# Patient Record
Sex: FEMALE
[De-identification: names, ages, dates, MRNs, and addresses within clinical notes are randomized per-mention and may not be internally consistent; named-entity substitution may affect disease eponyms.]

---

## 2019-11-18 LAB
ALBUMIN SERPL-MCNC: 3.7 G/DL (ref 3.5–5)
ALBUMIN/GLOB SERPL: 1.2 {RATIO} (ref 0.8–2)
ALP SERPL-CCNC: 68 IU/L (ref 40–150)
ALT SERPL-CCNC: 14 IU/L (ref 0–55)
ANION GAP SERPL CALC-SCNC: 11.8 MMOL/L (ref 8–16)
BASOPHILS # BLD AUTO: 0 10*3/UL (ref 0–0.1)
BASOPHILS NFR BLD AUTO: 0.7 % (ref 0–1)
BUN SERPL-MCNC: 17 MG/DL (ref 7–26)
BUN/CREAT SERPL: 23 (ref 6–25)
CALCIUM SERPL-MCNC: 9.3 MG/DL (ref 8.4–10.2)
CHLORIDE SERPL-SCNC: 103 MMOL/L (ref 98–107)
CO2 SERPL-SCNC: 26 MMOL/L (ref 22–29)
DEPRECATED NEUTROPHILS # BLD AUTO: 3.3 10*3/UL (ref 2.1–6.9)
EGFRCR SERPLBLD CKD-EPI 2021: > 60 ML/MIN (ref 60–?)
EOSINOPHIL # BLD AUTO: 0.2 10*3/UL (ref 0–0.4)
EOSINOPHIL NFR BLD AUTO: 3.4 % (ref 0–6)
ERYTHROCYTE [DISTWIDTH] IN CORD BLOOD: 13.2 % (ref 11.7–14.4)
GLOBULIN PLAS-MCNC: 3.1 G/DL (ref 2.3–3.5)
GLUCOSE SERPLBLD-MCNC: 86 MG/DL (ref 74–118)
HCT VFR BLD AUTO: 36.1 % (ref 34.2–44.1)
HGB BLD-MCNC: 12.2 G/DL (ref 12–16)
LYMPHOCYTES # BLD: 2.1 10*3/UL (ref 1–3.2)
LYMPHOCYTES NFR BLD AUTO: 34.2 % (ref 18–39.1)
MCH RBC QN AUTO: 31.6 PG (ref 28–32)
MCHC RBC AUTO-ENTMCNC: 33.8 G/DL (ref 31–35)
MCV RBC AUTO: 93.5 FL (ref 81–99)
MONOCYTES # BLD AUTO: 0.5 10*3/UL (ref 0.2–0.8)
MONOCYTES NFR BLD AUTO: 7.9 % (ref 4.4–11.3)
NEUTS SEG NFR BLD AUTO: 53.5 % (ref 38.7–80)
PLATELET # BLD AUTO: 222 X10E3/UL (ref 140–360)
POTASSIUM SERPL-SCNC: 3.8 MMOL/L (ref 3.5–5.1)
RBC # BLD AUTO: 3.86 X10E6/UL (ref 3.6–5.1)
SODIUM SERPL-SCNC: 137 MMOL/L (ref 136–145)

## 2019-11-18 NOTE — DIAGNOSTIC IMAGING REPORT
EXAMINATION:  CHEST 2 VIEWS    



INDICATION: Pre-operative



COMPARISON: None

     

FINDINGS:



LINES/TUBES:None



LUNGS:The lungs are well-inflated. No focal consolidation or pulmonary edema.



PLEURA:No pleural effusion or pneumothorax.



MEDIASTINUM:The cardiomediastinal silhouette appears normal in size and shape.

Atherosclerotic calcifications of the thoracic aorta.



BONES/SOFT TISSUES:No acute osseous injury.



ABDOMEN:No free air under the diaphragm. Status post cholecystectomy.





IMPRESSION: 

No focal pneumonia or pulmonary edema.



Signed by: Betty Le MD on 11/18/2019 1:19 PM

## 2019-11-19 ENCOUNTER — HOSPITAL ENCOUNTER (OUTPATIENT)
Dept: HOSPITAL 88 - OR | Age: 69
Setting detail: OBSERVATION
LOS: 1 days | Discharge: HOME | End: 2019-11-20
Attending: OBSTETRICS & GYNECOLOGY | Admitting: OBSTETRICS & GYNECOLOGY
Payer: MEDICARE

## 2019-11-19 VITALS — DIASTOLIC BLOOD PRESSURE: 76 MMHG | SYSTOLIC BLOOD PRESSURE: 126 MMHG

## 2019-11-19 VITALS — DIASTOLIC BLOOD PRESSURE: 75 MMHG | SYSTOLIC BLOOD PRESSURE: 123 MMHG

## 2019-11-19 VITALS — SYSTOLIC BLOOD PRESSURE: 123 MMHG | DIASTOLIC BLOOD PRESSURE: 75 MMHG

## 2019-11-19 VITALS — SYSTOLIC BLOOD PRESSURE: 142 MMHG | DIASTOLIC BLOOD PRESSURE: 81 MMHG

## 2019-11-19 VITALS — BODY MASS INDEX: 27.58 KG/M2 | WEIGHT: 161.56 LBS | HEIGHT: 64 IN

## 2019-11-19 VITALS — DIASTOLIC BLOOD PRESSURE: 81 MMHG | SYSTOLIC BLOOD PRESSURE: 142 MMHG

## 2019-11-19 DIAGNOSIS — Z01.812: ICD-10-CM

## 2019-11-19 DIAGNOSIS — E11.9: ICD-10-CM

## 2019-11-19 DIAGNOSIS — Z01.810: ICD-10-CM

## 2019-11-19 DIAGNOSIS — N81.2: ICD-10-CM

## 2019-11-19 DIAGNOSIS — Z01.811: ICD-10-CM

## 2019-11-19 DIAGNOSIS — Z82.49: ICD-10-CM

## 2019-11-19 DIAGNOSIS — N81.11: Primary | ICD-10-CM

## 2019-11-19 DIAGNOSIS — I10: ICD-10-CM

## 2019-11-19 DIAGNOSIS — Z83.3: ICD-10-CM

## 2019-11-19 PROCEDURE — 36415 COLL VENOUS BLD VENIPUNCTURE: CPT

## 2019-11-19 PROCEDURE — 85025 COMPLETE CBC W/AUTO DIFF WBC: CPT

## 2019-11-19 PROCEDURE — 82948 REAGENT STRIP/BLOOD GLUCOSE: CPT

## 2019-11-19 PROCEDURE — 80053 COMPREHEN METABOLIC PANEL: CPT

## 2019-11-19 PROCEDURE — 71046 X-RAY EXAM CHEST 2 VIEWS: CPT

## 2019-11-19 PROCEDURE — 86850 RBC ANTIBODY SCREEN: CPT

## 2019-11-19 PROCEDURE — 57282 COLPOPEXY EXTRAPERITONEAL: CPT

## 2019-11-19 PROCEDURE — 93005 ELECTROCARDIOGRAM TRACING: CPT

## 2019-11-19 PROCEDURE — 88307 TISSUE EXAM BY PATHOLOGIST: CPT

## 2019-11-19 PROCEDURE — 58260 VAGINAL HYSTERECTOMY: CPT

## 2019-11-19 PROCEDURE — 86900 BLOOD TYPING SEROLOGIC ABO: CPT

## 2019-11-19 PROCEDURE — 88305 TISSUE EXAM BY PATHOLOGIST: CPT

## 2019-11-19 RX ADMIN — HYDROCODONE BITARTRATE AND ACETAMINOPHEN PRN EA: 10; 325 TABLET ORAL at 20:33

## 2019-11-19 RX ADMIN — SODIUM CHLORIDE, POTASSIUM CHLORIDE, SODIUM LACTATE AND CALCIUM CHLORIDE SCH MLS/HR: 600; 310; 30; 20 INJECTION, SOLUTION INTRAVENOUS at 23:59

## 2019-11-19 RX ADMIN — SODIUM CHLORIDE, POTASSIUM CHLORIDE, SODIUM LACTATE AND CALCIUM CHLORIDE SCH MLS/HR: 600; 310; 30; 20 INJECTION, SOLUTION INTRAVENOUS at 15:37

## 2019-11-19 RX ADMIN — SODIUM CHLORIDE, POTASSIUM CHLORIDE, SODIUM LACTATE AND CALCIUM CHLORIDE SCH MLS/HR: 600; 310; 30; 20 INJECTION, SOLUTION INTRAVENOUS at 07:59

## 2019-11-19 NOTE — OPERATIVE REPORT
DATE OF PROCEDURE:  

 

SURGEON:  Rebecca Johnston MD

 

PREOPERATIVE DIAGNOSIS:  Pelvic organ prolapse.

 

POSTOPERATIVE DIAGNOSIS:  Pelvic organ prolapse.

 

PROCEDURES:  Total vaginal hysterectomy, anterior repair sacrospinous colpopexy.

 

ASSISTANT:  Dr. Tiffanie Sullivan.

 

COMPLICATIONS:  None.

 

ESTIMATED BLOOD LOSS:  50 mL.

 

DESCRIPTION OF PROCEDURE:  The patient was taken to the OR.  General anesthesia was

induced.  She was prepped and draped in a normal sterile fashion, placed in dorsal

lithotomy position.  A rubber catheter was used to empty the bladder.  Right angle

retractor was placed inside the vagina and the uterus was held with a single-tooth

tenaculum.  Subvaginal tissue was injected with Marcaine with epinephrine 0.25% around

the cervix.  A circumferential vaginal skin incision was made at the level of the

bladder line around the cervix and the bladder was dissected off the cervix using curved

Leavitt scissors and gentle sweeps of latex wrapped on the index finger.  Following this,

the anterior wall of the vagina was held at the edge with 2 hemostats and the vagina was

dissected off the bladder using Metzenbaum scissors using push spread technique and

opened in the midline using the same instruments.  Two flaps of the vagina dissected off

the underlying bladder using both sharp and blunt dissection.  Following this, the pouch

of Isidro was opened with Metzenbaum scissors and weighted speculum was advanced in the

pouch of Isidro.  Following this, using the LigaSure, the uterosacral ligaments were

ligated.  The pedicle was occluded and cut.  The same was used on the other side.  The

uterine vessels were held using the LigaSure on both sides, cauterized and cut.

Following this, the uterus fundus was clipped outside with a finger around the fundus.

Anterior fold of the peritoneum was opened and at this stage, Sammi clamps were applied

at each side of the uterus at the level of the cornu.  Pedicles were freed.  Uterus was

sent to pathology and the pedicles were ligated using Vicryl 0 stitch.  Hemostasis was

found to be adequate.  At this stage, pelvic fascia anteriorly at the level of the

inferior pubic ramus was pierced with the index finger.  Ischial spine and sacrospinous

ligament were palpated.  The same was repeated on the other side and using the Capio

needle burk, a Vicryl suture was hitched to the sacrospinous ligament on the other end

and was hitched to the vaginal vault.  The pubocervical ligaments were approximated

using Vicryl 2-0 sutures.  Excess vaginal skin was trimmed off using curved Leavitt

scissors.  The vagina was closed with interlocking stitches of Vicryl 0 and tying the

sacrospinous sutures.  The vaginal vault was lifted up.  Suction and irrigation of the

vaginal cavity was performed using warm saline.  Vaginal pack was inserted and a Gonzales

catheter was placed inside 

the bladder and revealed clear urine.  The patient tolerated the procedure well.  Lap,

instrument, and needle counts was correct x2 at the end of the procedure. 

 

 

 

 

______________________________

Rebecca Johnston MD DD/STEVE

D:  11/19/2019 09:06:21

T:  11/19/2019 13:54:08

Job #:  [id number]

## 2019-11-19 NOTE — NUR
WALKING ROUNDS PERFORMED, RECEIVED PT LAYING SEMI FOWLERS IN BED, AAOX3, RR EVEN AND 
NON-LABORED, ON ROOM AIR. PT ASSISTED TO MOVE FROM BED TO HARDBACK CHAIR. LEFT PT SITTING IN 
CHAIR WITH CALL LIGHT WITHIN REACH.

## 2019-11-19 NOTE — NUR
RECEIVED PATIENT FROM RECOVERY. PATIENT A/O X3, EVEN RESPIRATIONS ON RA. LUNG SOUNDS CLEAR 
TO AUSCULTATION. BOWEL SOUNDS PRESENT. VAGINAL PACKING IN PLACE, NO DRAINAGE. SCD'S/RAY HOSE 
IN PLACE BILATERALLY. RIGHT HAND 20 GAUGE IV WITH LR @ 125 CC/HR. NO PAIN AT THIS TIME. 
FAMILY AT BEDSIDE. VS STABLE. ORIENTED PATIENT TO ROOM AND CALL LIGHT. BED LOW, WHEELS 
LOCKED, SIDE RAILS X2. CALL LIGHT IN REACH WILL CONTINUE TO MONITOR PATIENT.

## 2019-11-19 NOTE — NUR
Visit made by the Spiritual Care Department Pastoral Visitor, Aylin Mercado. PV provided 
pastoral presence, prayer, hospitality, and supportive listening. 

Pastoral Visitor informed pt/family of the scope of Chaplaincy Services and availability.



[facility]



Spiritual Care Department

O: [phone]

Pager: [phone])

## 2019-11-20 VITALS — SYSTOLIC BLOOD PRESSURE: 120 MMHG | DIASTOLIC BLOOD PRESSURE: 68 MMHG

## 2019-11-20 VITALS — SYSTOLIC BLOOD PRESSURE: 141 MMHG | DIASTOLIC BLOOD PRESSURE: 78 MMHG

## 2019-11-20 VITALS — DIASTOLIC BLOOD PRESSURE: 68 MMHG | SYSTOLIC BLOOD PRESSURE: 120 MMHG

## 2019-11-20 VITALS — DIASTOLIC BLOOD PRESSURE: 74 MMHG | SYSTOLIC BLOOD PRESSURE: 134 MMHG

## 2019-11-20 VITALS — SYSTOLIC BLOOD PRESSURE: 112 MMHG | DIASTOLIC BLOOD PRESSURE: 65 MMHG

## 2019-11-20 RX ADMIN — SODIUM CHLORIDE, POTASSIUM CHLORIDE, SODIUM LACTATE AND CALCIUM CHLORIDE SCH MLS/HR: 600; 310; 30; 20 INJECTION, SOLUTION INTRAVENOUS at 07:58

## 2019-11-20 RX ADMIN — HYDROCODONE BITARTRATE AND ACETAMINOPHEN PRN EA: 10; 325 TABLET ORAL at 10:24

## 2019-11-20 NOTE — NUR
VAGINAL PACKING REMOVED. CLEANSED WITH BATH WIPE AND APPLIED FEMININE PAD WITH DISPOSABLE 
UNDERWEAR. 



WILDER DISCONTINUED. CATHETER TIP INTACT.

## 2019-11-20 NOTE — NUR
patient alert and oriented with daughter at bedside. discharge instructions given at this 
time, patient verbalized understanding. IV discontinued, catheter in tact and small dressing 
applied. patient to be wheeled out to personal auto for daughter to drive home.